# Patient Record
Sex: MALE | URBAN - METROPOLITAN AREA
[De-identification: names, ages, dates, MRNs, and addresses within clinical notes are randomized per-mention and may not be internally consistent; named-entity substitution may affect disease eponyms.]

---

## 2024-02-28 ENCOUNTER — OV CON (OUTPATIENT)
Dept: URBAN - METROPOLITAN AREA CLINIC 92 | Facility: CLINIC | Age: 44
End: 2024-02-28

## 2024-03-07 ENCOUNTER — OV CON (OUTPATIENT)
Dept: URBAN - METROPOLITAN AREA CLINIC 92 | Facility: CLINIC | Age: 44
End: 2024-03-07
Payer: COMMERCIAL

## 2024-03-07 VITALS
BODY MASS INDEX: 47.73 KG/M2 | SYSTOLIC BLOOD PRESSURE: 129 MMHG | DIASTOLIC BLOOD PRESSURE: 74 MMHG | HEIGHT: 66 IN | TEMPERATURE: 97.5 F | WEIGHT: 297 LBS | HEART RATE: 85 BPM

## 2024-03-07 DIAGNOSIS — Z80.0 FAMILY HISTORY OF COLON CANCER: ICD-10-CM

## 2024-03-07 DIAGNOSIS — D50.9 IRON DEFICIENCY ANEMIA, UNSPECIFIED IRON DEFICIENCY ANEMIA TYPE: ICD-10-CM

## 2024-03-07 DIAGNOSIS — K62.5 RECTAL BLEEDING: ICD-10-CM

## 2024-03-07 PROBLEM — 87522002: Status: ACTIVE | Noted: 2024-03-07

## 2024-03-07 PROCEDURE — 99204 OFFICE O/P NEW MOD 45 MIN: CPT

## 2024-03-07 NOTE — HPI-TODAY'S VISIT:
42 yo presents due to rectal bleeding. Was referred to us by Dr Bryce Turcios a copy of this note was sent to refering provider.   He states he has been having rectal bleeding since October 2023.  Bleeding is intermittent and can be when he wipes or in the stool itself.  He has not had any bleeding in the past 2 weeks.  He has 1 bowel movement daily and denies any constipation or diarrhea.  He does strain at times.  Denies any melena, abdominal pain or rectal pain.  He had labs with his primary care in February that demonstrated decreased hemoglobin at 12.6, hematocrit 41.4, MCV 72.  Normal TSH and CMP.  Hemoglobin A1c was elevated at 6.5.  This was his first time having labs in over 10 years.  He does remember being told that he had iron deficiency as a child.  He was started on iron by his primary care but has not had any iron labs. He has no GERD symptoms, dysphagia, odynophagia, nausea, vomiting, fever, chills.  He does not use any NSAIDs. He does have family history of colorectal cancer in his paternal aunt who was diagnosed at age 38.  Otherwise no GI related cancers in the family or colon polyps.  He has no family history of IBD.

## 2024-03-08 LAB
FERRITIN, SERUM: 62
IRON BIND.CAP.(TIBC): 371
IRON SATURATION: 12
IRON: 45

## 2024-04-22 ENCOUNTER — COL/EGD (OUTPATIENT)
Dept: URBAN - METROPOLITAN AREA SURGERY CENTER 16 | Facility: SURGERY CENTER | Age: 44
End: 2024-04-22

## 2024-05-09 ENCOUNTER — DASHBOARD ENCOUNTERS (OUTPATIENT)
Age: 44
End: 2024-05-09

## 2024-05-10 ENCOUNTER — TELEPHONE ENCOUNTER (OUTPATIENT)
Dept: URBAN - METROPOLITAN AREA CLINIC 92 | Facility: CLINIC | Age: 44
End: 2024-05-10

## 2024-05-13 ENCOUNTER — OFFICE VISIT (OUTPATIENT)
Dept: URBAN - METROPOLITAN AREA CLINIC 92 | Facility: CLINIC | Age: 44
End: 2024-05-13

## 2024-05-31 ENCOUNTER — OFFICE VISIT (OUTPATIENT)
Dept: URBAN - METROPOLITAN AREA SURGERY CENTER 16 | Facility: SURGERY CENTER | Age: 44
End: 2024-05-31

## 2024-06-20 ENCOUNTER — OFFICE VISIT (OUTPATIENT)
Dept: URBAN - METROPOLITAN AREA CLINIC 92 | Facility: CLINIC | Age: 44
End: 2024-06-20